# Patient Record
Sex: MALE | Race: WHITE | Employment: UNEMPLOYED | URBAN - METROPOLITAN AREA
[De-identification: names, ages, dates, MRNs, and addresses within clinical notes are randomized per-mention and may not be internally consistent; named-entity substitution may affect disease eponyms.]

---
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
## 2024-06-04 ENCOUNTER — HOSPITAL ENCOUNTER (EMERGENCY)
Age: 19
Discharge: HOME | End: 2024-06-04
Attending: EMERGENCY MEDICINE
Payer: COMMERCIAL

## 2024-06-04 VITALS
BODY MASS INDEX: 40.66 KG/M2 | HEIGHT: 70 IN | WEIGHT: 284 LBS | SYSTOLIC BLOOD PRESSURE: 154 MMHG | DIASTOLIC BLOOD PRESSURE: 90 MMHG | OXYGEN SATURATION: 100 % | TEMPERATURE: 98.4 F | RESPIRATION RATE: 16 BRPM | HEART RATE: 90 BPM

## 2024-06-04 DIAGNOSIS — J02.9 VIRAL PHARYNGITIS: Primary | ICD-10-CM

## 2024-06-04 LAB — S PYO AG THROAT QL: NEGATIVE

## 2024-06-04 PROCEDURE — 87081 CULTURE SCREEN ONLY: CPT

## 2024-06-04 PROCEDURE — 99283 EMERGENCY DEPT VISIT LOW MDM: CPT

## 2024-06-04 PROCEDURE — 87880 STREP A ASSAY W/OPTIC: CPT

## 2024-06-04 RX ORDER — IBUPROFEN 600 MG/1
600 TABLET ORAL 3 TIMES DAILY PRN
Qty: 30 TABLET | Refills: 0 | Status: SHIPPED | OUTPATIENT
Start: 2024-06-04

## 2024-06-04 RX ORDER — IBUPROFEN 600 MG/1
600 TABLET ORAL ONCE
Status: DISCONTINUED | OUTPATIENT
Start: 2024-06-04 | End: 2024-06-04 | Stop reason: HOSPADM

## 2024-06-04 ASSESSMENT — PAIN - FUNCTIONAL ASSESSMENT
PAIN_FUNCTIONAL_ASSESSMENT: ACTIVITIES ARE NOT PREVENTED
PAIN_FUNCTIONAL_ASSESSMENT: 0-10

## 2024-06-04 ASSESSMENT — PAIN DESCRIPTION - LOCATION: LOCATION: THROAT

## 2024-06-04 ASSESSMENT — PAIN SCALES - GENERAL: PAINLEVEL_OUTOF10: 5

## 2024-06-04 ASSESSMENT — PAIN DESCRIPTION - ONSET: ONSET: PROGRESSIVE

## 2024-06-04 ASSESSMENT — PAIN DESCRIPTION - FREQUENCY: FREQUENCY: CONTINUOUS

## 2024-06-04 ASSESSMENT — PAIN DESCRIPTION - PAIN TYPE: TYPE: ACUTE PAIN

## 2024-06-04 ASSESSMENT — PAIN DESCRIPTION - DESCRIPTORS: DESCRIPTORS: SORE

## 2024-06-04 NOTE — ED PROVIDER NOTES
CC:   Chief Complaint   Patient presents with    Pharyngitis        HPI:  Oskar is a 19 y.o. male who presents to the emergency department with several days of sore throat as well as mild cough and congestion.  His father is also been ill.  No fevers or chills no difficulty eating.  He did have an episode of emesis this morning.  No chest pain or shortness of breath.  History obtained via the patient    External records reviewed    ROS:  All Pertinent ROS Negative Unless otherwise stated within HPI.    VITALS:  Vitals:    06/04/24 1104   BP: (!) 154/90   Pulse: 90   Resp: 16   Temp: 98.4 °F (36.9 °C)   SpO2: 100%        PHYSICAL EXAM:      Vital signs reviewed  General:  Patient appeared in no distress  Vitals:  Vital signs reviewed, see nurses notes  Neck:  No JVD, or lymphadenopathy.  H EENT exam normocephalic atraumatic mucous membranes are moist oropharynx with mild posterior oropharynx erythema no exudate no petechiae no adenopathy TMs are clear bilaterally  Cardiovascular:  Regular rhythm, Normal sounds and absence of murmurs, rubs or gallops.  Lungs:  Clear to auscultation without rales, ronchi, or wheezing.  Abdomen:  Soft, unremarkable and without evidence of organomegally, masses, or abdominal aortic enlargement, No guarding.  Extremities:  Non-edematous and Normal distal pulses.    LABS/IMAGING:  Reviewed  No orders to display        Labs Reviewed   STREP SCREEN GROUP A THROAT   CULTURE, BETA STREP CONFIRM PLATES        MEDICATIONS ADMINISTERED:  Medications   ibuprofen (ADVIL;MOTRIN) tablet 600 mg (has no administration in time range)       Labs reviewed    Imaging Independently interpreted by me-concur with radiologist interpretation      MEDICAL DECISION MAKING:    Medications Ordered    Medications   ibuprofen (ADVIL;MOTRIN) tablet 600 mg (has no administration in time range)        Prescriptions Written:    New Prescriptions    IBUPROFEN (ADVIL;MOTRIN) 600 MG TABLET    Take 1 tablet by mouth 3

## 2024-06-04 NOTE — ED NOTES
AVS provided and reviewed with the patient. The patient verbalized understanding of care at home, follow up care, and emergent symptoms to return for. No questions or concerns verbalized at this time. The patient is alert, oriented, stable, and ambulatory out of the department at the time of discharge.

## 2024-06-04 NOTE — ED NOTES
Patient c/o sore throat this week. Emesis x1 today. Reports history of strep stating that he normally has vomiting with strep

## 2024-06-07 LAB — S PYO THROAT QL CULT: NORMAL

## (undated) DEVICE — MAJOR SET UP PK

## (undated) DEVICE — SPONGE,DISSECTOR,K,XRAY,9/16"X1/4",STRL: Brand: MEDLINE

## (undated) DEVICE — GLOVE ORANGE PI 7 1/2   MSG9075

## (undated) DEVICE — SUTURE PERMAHAND SZ 3-0 L18IN NONABSORBABLE BLK L26MM SH C013D

## (undated) DEVICE — DRAPE,REIN 53X77,STERILE: Brand: MEDLINE

## (undated) DEVICE — DUP USE 393690 DRAIN INCISION PENROSE 18IN .25IN

## (undated) DEVICE — GAUZE,SPONGE,4"X4",8PLY,STRL,LF,10/TRAY: Brand: MEDLINE

## (undated) DEVICE — SUPPORT SCROT L AD L39-44IN SFT KNIT PCH SUSP WAISTBAND

## (undated) DEVICE — SUTURE CHROMIC GUT SZ 3-0 L27IN ABSRB BRN L26MM SH 1/2 CIR G122H

## (undated) DEVICE — SUTURE PERMA-HAND SZ 0 L60IN NONABSORBABLE BLK SILK BRAID SA6H

## (undated) DEVICE — ELECTRODE PT RET AD L9FT HI MOIST COND ADH HYDRGEL CORDED

## (undated) DEVICE — GOWN SIRUS NONREIN XL W/TWL: Brand: MEDLINE INDUSTRIES, INC.

## (undated) DEVICE — SUTURE NRLN SZ 2-0 L18IN NONABSORBABLE BLK L26MM SH 1/2 CIR C512D